# Patient Record
(demographics unavailable — no encounter records)

---

## 2024-10-18 NOTE — PLAN
[FreeTextEntry1] : Laparoscopic cholecystectomy to be scheduled Medical and cardiology clearance requested

## 2024-10-18 NOTE — PHYSICAL EXAM
[de-identified] : Well-appearing, no acute distress [de-identified] : Sclerae anicteric [de-identified] : Normal respirations [de-identified] : Soft, nondistended, mildly tender to palpation in the right upper quadrant.  Negative Harris sign.  No guarding or rebound.  Well-healed laparoscopic incision sites, and well-healed small epigastric midline incision site.

## 2024-10-18 NOTE — PHYSICAL EXAM
[de-identified] : Well-appearing, no acute distress [de-identified] : Sclerae anicteric [de-identified] : Normal respirations [de-identified] : Soft, nondistended, mildly tender to palpation in the right upper quadrant.  Negative Harris sign.  No guarding or rebound.  Well-healed laparoscopic incision sites, and well-healed small epigastric midline incision site.

## 2024-10-18 NOTE — HISTORY OF PRESENT ILLNESS
[de-identified] : BA  is a 76 year  female  here for a consultation for biliary colic. Patient reports a long history of intermittent epigastric and right upper quadrant abdominal pain rating to the back.  This is occasionally associated with nausea.  She reports no fevers or chills.  Episodes have becoming more frequent and occurring almost daily.  She recently presented to [facility] with chest pain and epigastric pain.  Workup included CT scan, which demonstrated gallstones without ductal dilatation.  Diverticulosis without diverticulitis. Ultrasound demonstrated echogenic 0.5 cm focus at the gallbladder neck, consistent with polyp or gallbladder sludge.  No gallbladder wall thickening.  No intra or extrahepatic bile duct dilatation.  Cardiac workup was also completed at this time, and as per the patient, was negative for acute coronary syndrome.  Past medical/surgical history is notable for stage II colon cancer status post colectomy in 2010.  She underwent a ventral hernia repair with mesh in 2012.  In 2021 she had bladder surgery including hysterectomy.  Medical history also notable for hypertension and migraines.

## 2024-10-18 NOTE — ASSESSMENT
[FreeTextEntry1] : 76-year-old female with hypertension with symptomatic cholelithiasis/gallbladder sludge. Past surgical history is notable for colectomy, ventral hernia repair, hysterectomy.  I recommended laparoscopic cholecystectomy. Risks, benefits, alternatives discussed at length with patient. This included discussion of laparoscopic approach, risk of bleeding, small risk of injury to biliary ductal system, and possibility of conversion to open procedure. The patient expressed understanding and wishes to proceed

## 2024-10-18 NOTE — HISTORY OF PRESENT ILLNESS
[de-identified] : BA  is a 76 year  female  here for a consultation for biliary colic. Patient reports a long history of intermittent epigastric and right upper quadrant abdominal pain rating to the back.  This is occasionally associated with nausea.  She reports no fevers or chills.  Episodes have becoming more frequent and occurring almost daily.  She recently presented to [facility] with chest pain and epigastric pain.  Workup included CT scan, which demonstrated gallstones without ductal dilatation.  Diverticulosis without diverticulitis. Ultrasound demonstrated echogenic 0.5 cm focus at the gallbladder neck, consistent with polyp or gallbladder sludge.  No gallbladder wall thickening.  No intra or extrahepatic bile duct dilatation.  Cardiac workup was also completed at this time, and as per the patient, was negative for acute coronary syndrome.  Past medical/surgical history is notable for stage II colon cancer status post colectomy in 2010.  She underwent a ventral hernia repair with mesh in 2012.  In 2021 she had bladder surgery including hysterectomy.  Medical history also notable for hypertension and migraines.

## 2024-11-22 NOTE — HISTORY OF PRESENT ILLNESS
[de-identified] : BA is a 76 year old female s/p Laparoscopic lysis of adhesions and laparoscopic cholecystectomy on 11/13/2024. She is recuperating slowly.  She reports incisional pain has improved.  She is currently not taking any pain medication.  She did have constipation after the oxycodone, which has improved after starting MiraLAX.  Had a normal bowel movement today.  Reports no fevers or chills.  Appetite is normal.

## 2024-11-22 NOTE — ASSESSMENT
[FreeTextEntry1] : 76-year-old female status post laparoscopic cholecystectomy on 11/13/2024. Pathology is pending.

## 2024-11-22 NOTE — HISTORY OF PRESENT ILLNESS
[de-identified] : BA is a 76 year old female s/p Laparoscopic lysis of adhesions and laparoscopic cholecystectomy on 11/13/2024. She is recuperating slowly.  She reports incisional pain has improved.  She is currently not taking any pain medication.  She did have constipation after the oxycodone, which has improved after starting MiraLAX.  Had a normal bowel movement today.  Reports no fevers or chills.  Appetite is normal.

## 2024-11-22 NOTE — PHYSICAL EXAM
[de-identified] : Well-appearing, no acute distress [de-identified] : Sclerae anicteric [de-identified] : Normal respirations [de-identified] : soft, nontender, nondistended.  Incisions well-healed without erythema or drainage.

## 2024-11-22 NOTE — PLAN
[FreeTextEntry1] : Advised to continue low-fat diet. Avoid heavy lifting for 4 weeks MiraLAX as needed for constipation Follow-up 2 weeks

## 2024-11-22 NOTE — PHYSICAL EXAM
[de-identified] : Well-appearing, no acute distress [de-identified] : Sclerae anicteric [de-identified] : Normal respirations [de-identified] : soft, nontender, nondistended.  Incisions well-healed without erythema or drainage.

## 2024-12-06 NOTE — PHYSICAL EXAM
[Alert] : alert [Oriented to Person] : oriented to person [Oriented to Place] : oriented to place [Oriented to Time] : oriented to time [de-identified] : Well-appearing, no acute distress [de-identified] : Sclerae anicteric [de-identified] : Normal respirations [de-identified] : soft, nontender, nondistended.  Incisions well-healed without erythema or drainage.

## 2024-12-06 NOTE — ASSESSMENT
[FreeTextEntry1] : 76-year-old female recovering well status post laparoscopic cholecystectomy on 11/13/2024. New symptom of urinary frequency, without associated hematuria, dysuria or  fever

## 2024-12-06 NOTE — HISTORY OF PRESENT ILLNESS
[de-identified] : BA is a 76 year old female s/p Laparoscopic lysis of adhesions and laparoscopic cholecystectomy on 11/13/2024. She feels she is improving overall.  Her right upper quadrant discomfort is better.  She is able to move around more easily.  She is tolerating regular diet with normal bowel movements. She does report increase in frequency of urination, with no associated hematuria, dysuria, or pelvic pain.  She reports no fevers.

## 2024-12-06 NOTE — HISTORY OF PRESENT ILLNESS
[de-identified] : BA is a 76 year old female s/p Laparoscopic lysis of adhesions and laparoscopic cholecystectomy on 11/13/2024. She feels she is improving overall.  Her right upper quadrant discomfort is better.  She is able to move around more easily.  She is tolerating regular diet with normal bowel movements. She does report increase in frequency of urination, with no associated hematuria, dysuria, or pelvic pain.  She reports no fevers.

## 2024-12-06 NOTE — PHYSICAL EXAM
[Alert] : alert [Oriented to Person] : oriented to person [Oriented to Place] : oriented to place [Oriented to Time] : oriented to time [de-identified] : Well-appearing, no acute distress [de-identified] : Sclerae anicteric [de-identified] : Normal respirations [de-identified] : soft, nontender, nondistended.  Incisions well-healed without erythema or drainage.

## 2024-12-06 NOTE — PLAN
[FreeTextEntry1] : Continue low-fat diet Patient will continue to monitor her urinary symptoms, if no improvement will see her PCP for UA/urine culture Follow-up with me as needed